# Patient Record
Sex: MALE | Race: WHITE | Employment: STUDENT | URBAN - METROPOLITAN AREA
[De-identification: names, ages, dates, MRNs, and addresses within clinical notes are randomized per-mention and may not be internally consistent; named-entity substitution may affect disease eponyms.]

---

## 2018-08-26 ENCOUNTER — HOSPITAL ENCOUNTER (EMERGENCY)
Age: 28
Discharge: HOME | End: 2018-08-26
Attending: EMERGENCY MEDICINE
Payer: COMMERCIAL

## 2018-08-26 ENCOUNTER — APPOINTMENT (OUTPATIENT)
Dept: GENERAL RADIOLOGY | Age: 28
End: 2018-08-26
Payer: COMMERCIAL

## 2018-08-26 VITALS
HEART RATE: 61 BPM | BODY MASS INDEX: 26.19 KG/M2 | HEIGHT: 73 IN | SYSTOLIC BLOOD PRESSURE: 141 MMHG | WEIGHT: 197.6 LBS | OXYGEN SATURATION: 98 % | TEMPERATURE: 97.9 F | DIASTOLIC BLOOD PRESSURE: 62 MMHG | RESPIRATION RATE: 16 BRPM

## 2018-08-26 DIAGNOSIS — S93.602A FOOT SPRAIN, LEFT, INITIAL ENCOUNTER: Primary | ICD-10-CM

## 2018-08-26 PROCEDURE — 73630 X-RAY EXAM OF FOOT: CPT

## 2018-08-26 PROCEDURE — 99283 EMERGENCY DEPT VISIT LOW MDM: CPT

## 2018-08-26 ASSESSMENT — PAIN SCALES - GENERAL: PAINLEVEL_OUTOF10: 2

## 2018-08-26 ASSESSMENT — PAIN DESCRIPTION - LOCATION: LOCATION: FOOT

## 2018-08-26 ASSESSMENT — PAIN DESCRIPTION - PAIN TYPE: TYPE: ACUTE PAIN

## 2018-08-26 ASSESSMENT — PAIN DESCRIPTION - DESCRIPTORS: DESCRIPTORS: ACHING;CONSTANT

## 2018-08-26 ASSESSMENT — PAIN DESCRIPTION - ORIENTATION: ORIENTATION: LEFT

## 2018-08-26 NOTE — ED PROVIDER NOTES
CHIEF COMPLAINT  Foot Pain      HISTORY OF PRESENT ILLNESS  Dustin Dee is a 34 y.o. male, who presents to the ED with mild to moderate left foot pain after completing \"[employer] [email or web address]\" race in [facility]. Pain began in the middle of a 10 mile run. Patient completed the race, went to orthopedic tent after the race, was examined and received Tylenol. Has not taken analgesics since that time. Pain is 1 out of 10 in severity without ambulation 5 out of 10 in severity with ambulation. No numbness weakness paresthesias denies other injuries. No other complaints, modifying factors or associated symptoms. ROS    I have reviewed the following from the nursing documentation. History reviewed. No pertinent past medical history. History reviewed. No pertinent surgical history. Family History   Problem Relation Age of Onset    High Blood Pressure Mother     Cancer Maternal Grandmother     Breast Cancer Paternal Grandmother      Social History     Social History    Marital status: Single     Spouse name: N/A    Number of children: N/A    Years of education: N/A     Occupational History    STUDENT      FULL TIME @ [employer] [id number]     Social History Main Topics    Smoking status: Never Smoker    Smokeless tobacco: Never Used    Alcohol use Yes      Comment: occasionally    Drug use: No    Sexual activity: Not on file     Other Topics Concern    Not on file     Social History Narrative    No narrative on file     No current facility-administered medications for this encounter. No current outpatient prescriptions on file. No Known Allergies  ROS       PHYSICAL EXAM  BP (!) 141/62   Pulse 61   Temp 97.9 °F (36.6 °C) (Oral)   Resp 16   Ht 6' 1\" (1.854 m)   Wt 89.6 kg (197 lb 9.6 oz)   SpO2 98%   BMI 26.07 kg/m²   GENERAL APPEARANCE: Awake and alert. Cooperative. In no acute distress.   EXTREMITIES:  Left foot examination shows mild erythema slight ecchymosis and soft tissue swelling of the mid foot